# Patient Record
Sex: FEMALE | Race: WHITE | NOT HISPANIC OR LATINO
[De-identification: names, ages, dates, MRNs, and addresses within clinical notes are randomized per-mention and may not be internally consistent; named-entity substitution may affect disease eponyms.]

---

## 2022-09-06 ENCOUNTER — RX ONLY (OUTPATIENT)
Age: 22
Setting detail: RX ONLY
End: 2022-09-06

## 2023-01-05 ENCOUNTER — APPOINTMENT (RX ONLY)
Dept: URBAN - METROPOLITAN AREA CLINIC 81 | Facility: CLINIC | Age: 23
Setting detail: DERMATOLOGY
End: 2023-01-05

## 2023-01-05 DIAGNOSIS — L24 IRRITANT CONTACT DERMATITIS: ICD-10-CM

## 2023-01-05 PROBLEM — L24.9 IRRITANT CONTACT DERMATITIS, UNSPECIFIED CAUSE: Status: ACTIVE | Noted: 2023-01-05

## 2023-01-05 PROCEDURE — 99203 OFFICE O/P NEW LOW 30 MIN: CPT

## 2023-01-05 PROCEDURE — ? PRESCRIPTION

## 2023-01-05 PROCEDURE — ? COUNSELING

## 2023-01-05 PROCEDURE — ? ADDITIONAL NOTES

## 2023-01-05 RX ORDER — TRIAMCINOLONE ACETONIDE 5 MG/G
OINTMENT TOPICAL
Qty: 15 | Refills: 3 | Status: ERX | COMMUNITY
Start: 2023-01-05

## 2023-01-05 RX ADMIN — TRIAMCINOLONE ACETONIDE: 5 OINTMENT TOPICAL at 00:00

## 2023-01-05 ASSESSMENT — LOCATION DETAILED DESCRIPTION DERM
LOCATION DETAILED: LEFT RING DISTAL INTERPHALANGEAL JOINT
LOCATION DETAILED: RIGHT RADIAL PALM
LOCATION DETAILED: LEFT DISTAL DORSAL MIDDLE FINGER
LOCATION DETAILED: LEFT ULNAR PALM

## 2023-01-05 ASSESSMENT — LOCATION ZONE DERM
LOCATION ZONE: FINGER
LOCATION ZONE: HAND

## 2023-01-05 ASSESSMENT — LOCATION SIMPLE DESCRIPTION DERM
LOCATION SIMPLE: LEFT RING FINGER
LOCATION SIMPLE: RIGHT HAND
LOCATION SIMPLE: LEFT HAND
LOCATION SIMPLE: LEFT MIDDLE FINGER

## 2023-01-05 NOTE — PROCEDURE: ADDITIONAL NOTES
Additional Notes: Wash hands with Cetaphil gentle skin cleanser to avoid drying and irritation due to washing with soap. Apply white cotton glove liners underneath gloves at work to prevent irritation.
Render Risk Assessment In Note?: no
Detail Level: Simple